# Patient Record
(demographics unavailable — no encounter records)

---

## 2024-12-23 NOTE — HEALTH RISK ASSESSMENT
[No] : No [0] : 2) Feeling down, depressed, or hopeless: Not at all (0) [Never] : Never [EyeExamDate] : 00/2024

## 2024-12-23 NOTE — PLAN
[FreeTextEntry1] : 54-year-old male for new patient physical exam.  Routine labs ordered today.  Referrals placed per patient request.  Colon cancer screening and lifestyle modifications discussed. All questions answered.  Patient voiced understanding and in agreement to plan.  Return to clinic as recommended.

## 2024-12-23 NOTE — HISTORY OF PRESENT ILLNESS
[FreeTextEntry1] : NPPE [de-identified] : 54-year-old male for new patient physical exam.  Patient requesting referral to cardiologist possibility of amyloidosis per hand surgeon whom patient sees for ongoing wrist pain.  Has procedure coming up and needs referral for this as well.  History of carpal tunnel.  Additionally patient requesting referral to podiatry for toenail fungus.

## 2024-12-23 NOTE — REVIEW OF SYSTEMS
[Fever] : no fever [Fatigue] : no fatigue [Discharge] : no discharge [Earache] : no earache [Sore Throat] : no sore throat [Chest Pain] : no chest pain [Palpitations] : no palpitations [Shortness Of Breath] : no shortness of breath [Cough] : no cough [Abdominal Pain] : no abdominal pain [Constipation] : no constipation [Diarrhea] : no diarrhea [Dysuria] : no dysuria [Joint Pain] : no joint pain [Headache] : no headache

## 2025-05-05 NOTE — HISTORY OF PRESENT ILLNESS
[FreeTextEntry1] : Patient is a 55 year-old with past medical history of peripheral neuropathy, recently presented to [facility] with shortness of breath, noted to have LVH, mildly reduced LV systolic function, imaging concerning for infiltrative cardiomyopathy, presents today for evaluation for possible amyloidosis.  He reports approximately 10 years of symptoms of wrist pain, trigger finger, and bilateral carpal tunnel syndrome. He is status post bilateral carpal tunnel surgery. He was scheduled for carpal tunnel surgery next week, but he has postponed it. His hand surgeon questioned why he had bilateral carpal tunnel syndrome. He suggested amyloidosis as a possibility.  He completed a triathlon in 2024 (5K running, 14 mile on bike, and 0.5 mile swim). Had back surgery in 2020 and subsequently developed lower extremity neuropathy that limited his exercise. The neuropathy is worse on the left side than the right side.  On 4/24/2025, he was noted to have an echo concerning for infiltrative disease with mildly reduced LV systolic function, and elevated pro-BNP.

## 2025-05-05 NOTE — DISCUSSION/SUMMARY
[EKG obtained to assist in diagnosis and management of assessed problem(s)] : EKG obtained to assist in diagnosis and management of assessed problem(s) [FreeTextEntry1] : 55 year-old presents for evaluation of possible amyloidosis suggested by his history of neuropathy and his recent TTE findings. Labs to evaluate for light chain disease. Technetium pyrophosphate scan to evaluate for TTR amyloid. Genetic testing sent today for TTR.

## 2025-05-05 NOTE — HISTORY OF PRESENT ILLNESS
[FreeTextEntry1] : Patient is a 55 year-old with past medical history of peripheral neuropathy, recently presented to [facility] with shortness of breath, noted to have LVH, mildly reduced LV systolic function, imaging concerning for infiltrative cardiomyopathy, presents today for evaluation for possible amyloidosis.  He reports approximately 10 years of symptoms of wrist pain, trigger finger, and bilateral carpal tunnel syndrome. He is status post bilateral carpal tunnel surgery. He was scheduled for carpal tunnel surgery next week, but he has postponed it. His hand surgeon questioned why he had bilateral carpal tunnel syndrome. He suggested amyloidosis as a possibility.  He completed a triathlon in 2024 (5K running, 14 mile on bike, and 0.5 mile swim). Had back surgery in 2020 and subsequently developed lower extremity neuropathy that limited his exercise. The neuropathy is worse on the left side than the right side.  On 4/24/2025, he was noted to have an echo concerning for infiltrative disease with mildly reduced LV systolic function, and elevated pro-BNP.

## 2025-05-05 NOTE — CARDIOLOGY SUMMARY
[de-identified] : 5/5/2025 - sinus rhythm at 64 bpm, right axis, poor R-wave progression, low voltage in limb leads [de-identified] : 4/24/2025 - septum 1.4 cm, PWT 1.3 cm, concentric LVH, mildly dilated LA, LVEF 45-50% [de-identified] : 4/24/2025 CTPA - negative for PE

## 2025-05-05 NOTE — CARDIOLOGY SUMMARY
[de-identified] : 5/5/2025 - sinus rhythm at 64 bpm, right axis, poor R-wave progression, low voltage in limb leads [de-identified] : 4/24/2025 - septum 1.4 cm, PWT 1.3 cm, concentric LVH, mildly dilated LA, LVEF 45-50% [de-identified] : 4/24/2025 CTPA - negative for PE

## 2025-06-12 NOTE — DISCUSSION/SUMMARY
[EKG obtained to assist in diagnosis and management of assessed problem(s)] : EKG obtained to assist in diagnosis and management of assessed problem(s) [FreeTextEntry1] : 55-year-old returns following recent diagnosis of cardiac amyloidosis.   Light chain ratio was normal.  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  We discussed the pathophysiology of cardiac amyloidosis in detail to include the different types of disease, reasons for clinical suspicion, signs and symptoms, diagnostic testing and potential treatment options.   Planning to start stabilizer therapy for patient with confirmed amyloid cardiomyopathy.  Will also start TTR silencer therapy with Amvuttra based on Helios B data showing improvement in mortality and heart failure hospitalizations. He understands the need for vitamin A supplementation while on therapy.   Continue furosemide 20 mg daily. Continue daily weight monitoring.  Adding SGLT-2i with Farxiga 10 mg daily.   Labs today with further recommendations thereafter.  Recommend screening for his sister, Pema Dickson RN.  Follow up in the office in one month.

## 2025-06-12 NOTE — END OF VISIT
[FreeTextEntry3] : I, Danilo Paulino MD, personally performed the evaluation and management (E/M) services for this established patient who presents today with (a) new problem(s)/exacerbation of (an) existing condition(s). That E/M includes conducting the clinically appropriate interval history &/or exam, assessing all new/exacerbated conditions, and establishing a new plan of care. Today, Norma Regan NP was here to observe my evaluation and management service for this new problem/exacerbated condition and follow the plan of care established by me going forward.

## 2025-06-12 NOTE — HISTORY OF PRESENT ILLNESS
[FreeTextEntry1] : Patient is a 55 year-old with past medical history of peripheral neuropathy, recently presented to [facility] with shortness of breath, noted to have LVH, mildly reduced LV systolic function, imaging concerning for infiltrative cardiomyopathy, presents today for evaluation for possible amyloidosis.  He reports approximately 10 years of symptoms of wrist pain, trigger finger, and bilateral carpal tunnel syndrome. He is status post bilateral carpal tunnel surgery. He was scheduled for carpal tunnel surgery next week, but he has postponed it. His hand surgeon questioned why he had bilateral carpal tunnel syndrome. He suggested amyloidosis as a possibility.  He completed a triathlon in 2024 (5K running, 14 mile on bike, and 0.5 mile swim). Had back surgery in 2020 and subsequently developed lower extremity neuropathy that limited his exercise. The neuropathy is worse on the left side than the right side.  On 4/24/2025, he was noted to have an echo concerning for infiltrative disease with mildly reduced LV systolic function, and elevated pro-BNP.

## 2025-06-12 NOTE — HISTORY OF PRESENT ILLNESS
[FreeTextEntry1] : Patient is a 55 year-old with past medical history of peripheral neuropathy, recently presented to [facility] with shortness of breath, noted to have LVH, mildly reduced LV systolic function, imaging concerning for infiltrative cardiomyopathy, presents today for evaluation for possible amyloidosis.  He reports approximately 10 years of symptoms of wrist pain, trigger finger, and bilateral carpal tunnel syndrome. He is status post bilateral carpal tunnel surgery. He was scheduled for carpal tunnel surgery next week, but he has postponed it. His hand surgeon questioned why he had bilateral carpal tunnel syndrome. He suggested amyloidosis as a possibility.  He completed a triathlon in 2024 (5K running, 14 mile on bike, and 0.5 mile swim). Had back surgery in 2020 and subsequently developed lower extremity neuropathy that limited his exercise. The neuropathy is worse on the left side than the right side.  On 4/24/2025, he was noted to have an echo concerning for infiltrative disease with mildly reduced LV systolic function, and elevated pro-BNP.

## 2025-06-12 NOTE — CARDIOLOGY SUMMARY
[de-identified] : 5/5/2025 - sinus rhythm at 64 bpm, right axis, poor R-wave progression, low voltage in limb leads [de-identified] : 4/24/2025 - septum 1.4 cm, PWT 1.3 cm, concentric LVH, mildly dilated LA, LVEF 45-50% [de-identified] : 4/24/2025 CTPA - negative for PE

## 2025-06-12 NOTE — CARDIOLOGY SUMMARY
[de-identified] : 5/5/2025 - sinus rhythm at 64 bpm, right axis, poor R-wave progression, low voltage in limb leads [de-identified] : 4/24/2025 - septum 1.4 cm, PWT 1.3 cm, concentric LVH, mildly dilated LA, LVEF 45-50% [de-identified] : 4/24/2025 CTPA - negative for PE

## 2025-06-12 NOTE — REASON FOR VISIT
[FreeTextEntry1] : 6/5/2025 Abner Meyer returns today for close interval follow up after his recent diagnosis of transthyretin cardiac amyloidosis. He continues to note fatigue and shortness of breath. He had been prescribed low dose furosemide which he accidentally skipped one day. In doing so he noticed that he felt worse and ended up taking the pill later in the day. He reports that his home weights have been stable and that his dietary habits are healthy. He drinks water, but not to an excess.   Has one sister.  He has 2 children, ages 7 and 4.

## 2025-06-12 NOTE — CARDIOLOGY SUMMARY
[de-identified] : 5/5/2025 - sinus rhythm at 64 bpm, right axis, poor R-wave progression, low voltage in limb leads [de-identified] : 4/24/2025 - septum 1.4 cm, PWT 1.3 cm, concentric LVH, mildly dilated LA, LVEF 45-50% [de-identified] : 4/24/2025 CTPA - negative for PE

## 2025-06-12 NOTE — HISTORY OF PRESENT ILLNESS
[FreeTextEntry1] : Patient is a 55 year-old with past medical history of peripheral neuropathy, recently presented to [facility] with shortness of breath, noted to have LVH, mildly reduced LV systolic function, imaging concerning for infiltrative cardiomyopathy, presents today for evaluation for possible amyloidosis.  He reports approximately 10 years of symptoms of wrist pain, trigger finger, and bilateral carpal tunnel syndrome. He is status post bilateral carpal tunnel surgery. He was scheduled for carpal tunnel surgery next week, but he has postponed it. His hand surgeon questioned why he had bilateral carpal tunnel syndrome. He suggested amyloidosis as a possibility.  He completed a triathlon in 2024 (5K running, 14 mile on bike, and 0.5 mile swim). Had back surgery in 2020 and subsequently developed lower extremity neuropathy that limited his exercise. The neuropathy is worse on the left side than the right side.  On 4/24/2025, he was noted to have an echo concerning for infiltrative disease with mildly reduced LV systolic function, and elevated pro-BNP.

## 2025-07-06 NOTE — CARDIOLOGY SUMMARY
[de-identified] : 5/5/2025 - sinus rhythm at 64 bpm, right axis, poor R-wave progression, low voltage in limb leads 7/3/2025- sinus rhythm at 72 bpm, right axis, unchanged from prior ECG [de-identified] : 4/24/2025 - septum 1.4 cm, PWT 1.3 cm, concentric LVH, mildly dilated LA, LVEF 45-50% [de-identified] : 4/24/2025 CTPA - negative for PE

## 2025-07-06 NOTE — DISCUSSION/SUMMARY
[EKG obtained to assist in diagnosis and management of assessed problem(s)] : EKG obtained to assist in diagnosis and management of assessed problem(s) [FreeTextEntry1] : 55-year-old returns following recent diagnosis of cardiac amyloidosis.   Light chain ratio was normal.  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  We discussed the pathophysiology of cardiac amyloidosis in detail to include the different types of disease, reasons for clinical suspicion, signs and symptoms, diagnostic testing and potential treatment options.   Continue stabilizer therapy with Attruby for patient with confirmed amyloid cardiomyopathy.  Will also start TTR silencer therapy with Amvuttra based on Helios B data showing improvement in mortality and heart failure hospitalizations. He understands the need for vitamin A supplementation while on therapy.   Continue furosemide 20 mg daily. Continue daily weight monitoring.  Continue SGLT-2i with Jardiance 10 mg daily.   Labs today with further recommendations thereafter.  Recommend screening for his sister, Pema Dickson RN.  Follow up in the office in 3 months.

## 2025-07-06 NOTE — HISTORY OF PRESENT ILLNESS
[FreeTextEntry1] : Patient is a 55 year-old with past medical history of peripheral neuropathy, recently presented to [facility] with shortness of breath, noted to have LVH, mildly reduced LV systolic function, imaging concerning for infiltrative cardiomyopathy, presents today for evaluation for possible amyloidosis.  He reports approximately 10 years of symptoms of wrist pain, trigger finger, and bilateral carpal tunnel syndrome. He is status post bilateral carpal tunnel surgery. He was scheduled for carpal tunnel surgery next week, but he has postponed it. His hand surgeon questioned why he had bilateral carpal tunnel syndrome. He suggested amyloidosis as a possibility.  He completed a triathlon in 2024 (5K running, 14 mile on bike, and 0.5 mile swim). Had back surgery in 2020 and subsequently developed lower extremity neuropathy that limited his exercise. The neuropathy is worse on the left side than the right side.  On 4/24/2025, he was noted to have an echo concerning for infiltrative disease with mildly reduced LV systolic function, and elevated pro-BNP.  6/5/2025 Abner Meyer returns today for close interval follow up after his recent diagnosis of transthyretin cardiac amyloidosis. He continues to note fatigue and shortness of breath. He had been prescribed low dose furosemide which he accidentally skipped one day. In doing so he noticed that he felt worse and ended up taking the pill later in the day. He reports that his home weights have been stable and that his dietary habits are healthy. He drinks water, but not to an excess.   Has one sister, a registered nurse.  He has 2 children, ages 7 and 4.

## 2025-07-06 NOTE — HISTORY OF PRESENT ILLNESS
[FreeTextEntry1] : Patient is a 55 year-old with past medical history of peripheral neuropathy, recently presented to [facility] with shortness of breath, noted to have LVH, mildly reduced LV systolic function, imaging concerning for infiltrative cardiomyopathy, presents today for evaluation for possible amyloidosis.  He reports approximately 10 years of symptoms of wrist pain, trigger finger, and bilateral carpal tunnel syndrome. He is status post bilateral carpal tunnel surgery. He was scheduled for carpal tunnel surgery next week, but he has postponed it. His hand surgeon questioned why he had bilateral carpal tunnel syndrome. He suggested amyloidosis as a possibility.  He completed a triathlon in 2024 (5K running, 14 mile on bike, and 0.5 mile swim). Had back surgery in 2020 and subsequently developed lower extremity neuropathy that limited his exercise. The neuropathy is worse on the left side than the right side.  On 4/24/2025, he was noted to have an echo concerning for infiltrative disease with mildly reduced LV systolic function, and elevated pro-BNP.  6/5/2025 Abner Meyer returns today for close interval follow up after his recent diagnosis of transthyretin cardiac amyloidosis. He continues to note fatigue and shortness of breath. He had been prescribed low dose furosemide which he accidentally skipped one day. In doing so he noticed that he felt worse and ended up taking the pill later in the day. He reports that his home weights have been stable and that his dietary habits are healthy. He drinks water, but not to an excess.   Has one sister, a registered nurse.  He has 2 children, ages 7 and 4.

## 2025-07-06 NOTE — REASON FOR VISIT
[Other: _____] : [unfilled] [FreeTextEntry1] : 7/3/2025 Abner returns today for close interval follow up. Since starting Attruby his hands feel better. 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 He also notes that his breathing is much better as he has no shortness of breath at the top of the stairs. Stable weight at 170 pounds. He is still exercising as before, just maybe a little less intensely. He has not been running.   He has been in communication with Melanie regarding Amvuttra and is planning for injection in [place]. It is not yet scheduled.

## 2025-07-06 NOTE — REASON FOR VISIT
[Other: _____] : [unfilled] [FreeTextEntry1] : 7/3/2025 Abner returns today for close interval follow up. Since starting Attruby his hands feel better. 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 He also notes that his breathing is much better as he has no shortness of breath at the top of the stairs. Stable weight at 170 pounds. He is still exercising as before, just maybe a little less intensely. He has not been running.   He has been in communication with Melanie regarding Amvuttra and is planning for injection in [place]. It is not yet scheduled.

## 2025-07-06 NOTE — CARDIOLOGY SUMMARY
[de-identified] : 5/5/2025 - sinus rhythm at 64 bpm, right axis, poor R-wave progression, low voltage in limb leads 7/3/2025- sinus rhythm at 72 bpm, right axis, unchanged from prior ECG [de-identified] : 4/24/2025 - septum 1.4 cm, PWT 1.3 cm, concentric LVH, mildly dilated LA, LVEF 45-50% [de-identified] : 4/24/2025 CTPA - negative for PE

## 2025-07-06 NOTE — END OF VISIT
[Time Spent: ___ minutes] : I have spent [unfilled] minutes of time on the encounter which excludes teaching and separately reported services. [FreeTextEntry3] : I, Danilo Paulino MD, personally performed the evaluation and management (E/M) services for this established patient who presents today with (a) new problem(s)/exacerbation of (an) existing condition(s). That E/M includes conducting the clinically appropriate interval history &/or exam, assessing all new/exacerbated conditions, and establishing a new plan of care. Today, Norma Regan NP was here to observe my evaluation and management service for this new problem/exacerbated condition and follow the plan of care established by me going forward.